# Patient Record
Sex: FEMALE | Race: OTHER | URBAN - METROPOLITAN AREA
[De-identification: names, ages, dates, MRNs, and addresses within clinical notes are randomized per-mention and may not be internally consistent; named-entity substitution may affect disease eponyms.]

---

## 2022-05-19 ENCOUNTER — OFFICE VISIT (OUTPATIENT)
Dept: FAMILY MEDICINE CLINIC | Age: 27
End: 2022-05-19

## 2022-05-19 VITALS
HEART RATE: 83 BPM | BODY MASS INDEX: 31.54 KG/M2 | HEIGHT: 63 IN | DIASTOLIC BLOOD PRESSURE: 83 MMHG | WEIGHT: 178 LBS | TEMPERATURE: 97.9 F | OXYGEN SATURATION: 98 % | SYSTOLIC BLOOD PRESSURE: 142 MMHG

## 2022-05-19 DIAGNOSIS — N39.3 STRESS INCONTINENCE, FEMALE: Primary | ICD-10-CM

## 2022-05-19 DIAGNOSIS — Z00.00 ENCOUNTER FOR SCREENING AND PREVENTATIVE CARE: ICD-10-CM

## 2022-05-19 LAB
BILIRUB UR QL STRIP: NEGATIVE
GLUCOSE UR-MCNC: NEGATIVE MG/DL
HCG URINE, QL. (POC): NEGATIVE
KETONES P FAST UR STRIP-MCNC: NEGATIVE MG/DL
PH UR STRIP: 7 [PH] (ref 4.6–8)
PROT UR QL STRIP: NEGATIVE
SP GR UR STRIP: 1.01 (ref 1–1.03)
UA UROBILINOGEN AMB POC: NORMAL (ref 0.2–1)
URINALYSIS CLARITY POC: CLEAR
URINALYSIS COLOR POC: YELLOW
URINE BLOOD POC: NORMAL
URINE LEUKOCYTES POC: NEGATIVE
URINE NITRITES POC: NEGATIVE
VALID INTERNAL CONTROL?: YES

## 2022-05-19 PROCEDURE — 81025 URINE PREGNANCY TEST: CPT | Performed by: NURSE PRACTITIONER

## 2022-05-19 PROCEDURE — 81003 URINALYSIS AUTO W/O SCOPE: CPT | Performed by: NURSE PRACTITIONER

## 2022-05-19 PROCEDURE — 99203 OFFICE O/P NEW LOW 30 MIN: CPT | Performed by: NURSE PRACTITIONER

## 2022-05-19 NOTE — PROGRESS NOTES
2022 : Martha Urbina (: 1995) is a 32 y.o. female, new patient, here for evaluation of the following chief complaint(s):  Pelvic Pain (radiating to her back pain. with vaginal itching x 1 month) and Skin Problem (Pt states she feels inside of her vagina an small ball 2 weeks ago)     ASSESSMENT/PLAN:  Below is the assessment and plan developed based on review of pertinent history, physical exam, labs, studies, and medications. 1. Stress incontinence, female  2. Encounter for screening and preventative care  -     AMB POC URINALYSIS DIP STICK AUTO W/O MICRO  -     AMB POC URINE PREGNANCY TEST, VISUAL COLOR COMPARISON  Kegels  May take ibuprofen 200 mg, 3 tab tid prn back pain. Return for 1 month F2F LK. No vaginal discharge and no odor  SUBJECTIVE/OBJECTIVE:  HPI Also used the injection 5 years ago. Had periods then. Used it for 2.5 years. Pain in lower back in the middle. Sometimes lower belly pain like menstrual pain, not now. Only has pain after standing for a long time. She has the injection every 3 months. Usually doesn't has the periods. She is spotting today and since Monday. February was a normal period. 5 days. Last injection was . Before that, Dec 28, 2021. Gave birth on 2021.     Results for orders placed or performed in visit on 22   AMB POC URINALYSIS DIP STICK AUTO W/O MICRO   Result Value Ref Range    Color (UA POC) Yellow     Clarity (UA POC) Clear     Glucose (UA POC) Negative Negative    Bilirubin (UA POC) Negative Negative    Ketones (UA POC) Negative Negative    Specific gravity (UA POC) 1.015 1.001 - 1.035    Blood (UA POC) 4+ Negative    pH (UA POC) 7.0 4.6 - 8.0    Protein (UA POC) Negative Negative    Urobilinogen (UA POC) 0.2 mg/dL 0.2 - 1    Nitrites (UA POC) Negative Negative    Leukocyte esterase (UA POC) Negative Negative   AMB POC URINE PREGNANCY TEST, VISUAL COLOR COMPARISON   Result Value Ref Range VALID INTERNAL CONTROL POC Yes     HCG urine, Ql. (POC) Negative Negative   With sneezing or cough has some urine release. Review of Systems: Negative for: fever, chest pain, shortness of breath, leg swelling. Social History:  reports that she has never smoked. She has never used smokeless tobacco. She reports previous alcohol use. She reports that she does not use drugs. Current Medications:   No current outpatient medications on file. Physical Examination: Patient's last menstrual period was 02/15/2022. Blood pressure (!) 142/83, pulse 83, temperature 97.9 °F (36.6 °C), temperature source Temporal, height 5' 2.99\" (1.6 m), weight 178 lb (80.7 kg), last menstrual period 02/15/2022, SpO2 98 %. General appearance - well developed, no acute distress. Chest - clear to auscultation. Heart - regular rate and rhythm without murmurs, rubs, or gallops. Abdomen - bowel sounds present x 4, NT, ND  Extremities - no CCE. Pelvic - no CMT or ovarian tenderness; no masses. Very slight prolapse, reducible, can be seen in vagina with bearing down. Tenderness noted lower back with palpation. No suprapubic tenderness. An electronic signature was used to authenticate this note.   -- Nestor Fink NP

## 2022-05-19 NOTE — PATIENT INSTRUCTIONS
Aparato reproductor femenino, vista lateral: Dibujo anatómico  Female Reproductive Organs, Side View: Anatomy Sketch     Revisado: 22 noviembre, 2021               Versión del contenido: 13.2  © 2006-2022 [employer]wise, Incorporated. Las instrucciones de cuidado fueron adaptadas bajo licencia por [employer] (which disclaims liability or warranty for this information). Si usted tiene [place] afección médica o sobre estas instrucciones, siempre pregunte a momin profesional de desean. Healthwise, Incorporated niega toda garantía o responsabilidad por momin uso de esta información. Ejercicios de Kegel: Instrucciones de cuidado  Kegel Exercises: Care Instructions  Generalidades     Los ejercicios de Kegel fortalecen los músculos que rodean la vejiga. Estos músculos controlan el flujo de [place]. Los ejercicios de Kegel a veces se llaman ejercicios \"para el piso pélvico\". Pueden ayudarle a prevenir la pérdida de [place] y a mantener los Fani Ream en momin lugar. Los ejercicios de Kegel pueden fortalecer los músculos pélvicos que se baker debilitado por la edad, el [place] y [place]. Pueden ayudar a prevenir o tratar la pérdida de orina. Usted hace los ejercicios de Kegel tensando los músculos que se utilizan al orinar. Probablemente tenga que hacer estos ejercicios carolyn varias semanas para sentirse mejor. La atención de seguimiento es evie parte clave de momin tratamiento y seguridad. Asegúrese de hacer y acudir a todas las citas, y llame a momin médico si está teniendo problemas. También es evie buena idea saber los resultados de adonay exámenes y mantener evie lista de los medicamentos que max. ¿Cómo puede cuidarse en el hogar? · Latasha ejercicios de Kegel. ? Detecte los músculos que necesita fortalecer. Para hacer esto, contraiga los músculos que impiden la salida de la orina mientras va al baño.  Estos son los mismos músculos que debe contraer carolyn los ejercicios de [place].  ? Contraiga los músculos lo primo posible. El abdomen y los muslos no deben moverse. ? Manténgalos apretados carolyn 3 segundos. Luego, relájelos otros 3 segundos. ? Empiece con 3 segundos. [place], añada 1 hortencia cada semana hasta que sea capaz de apretar carolyn 10 segundos. ? Repita lindsey ejercicio de 10 a 15 veces cada sesión. Latasha debi o más sesiones al día. · Puede verificar si está usando los músculos correctos. ? Tense los músculos que le ayudan a dejar de expulsar gases o que hacen que deje de orinar. Asegúrese de no [employer] del abdomen, las piernas o los glúteos. ? Coloque un dedo en la vagina y apriete los músculos a momin alrededor. Lo está haciendo rebeca cuando siente presión alrededor del dedo. El médico también puede sugerirle que se ponga pesos especiales en la vagina mientras hace los ejercicios. · Consulte con momin médico si no nota diferencia después de probar estos ejercicios carolyn varias semanas. Momin médico puede sugerirle que [place] de un fisioterapeuta o recomendarle otro tratamiento. ¿Dónde puede encontrar más información en inglés? Vaya a [email or web address]  Allie [id number] en la búsqueda para aprender más acerca de \"Ejercicios de [place]: Instrucciones de cuidado. \"  Revisado: 18 octubre, 2021               Versión del contenido: 13.2  © 7123-1712 [employer]. Las instrucciones de cuidado fueron adaptadas bajo licencia por [employer] (which disclaims liability or warranty for this information). Si usted tiene [place] afección médica o sobre estas instrucciones, siempre pregunte a momin profesional de desean. [facility] niega toda garantía o responsabilidad por momin uso de esta información.

## 2022-05-19 NOTE — PROGRESS NOTES
I have printed AVS and reviewed it with patient today. Patient verbalized understanding. I reviewed the patient information in the AVS with the patient and instructed her to read it at home. Patient verbalized understanding. Patient correctly stated her full name and date of birth prior to the information shared.  Analia with the [place] [id number] assisted with this discharge.  Adria Brizuela RN

## 2022-05-19 NOTE — PROGRESS NOTES
Results for orders placed or performed in visit on 05/19/22   AMB POC URINALYSIS DIP STICK AUTO W/O MICRO   Result Value Ref Range    Color (UA POC) Yellow     Clarity (UA POC) Clear     Glucose (UA POC) Negative Negative    Bilirubin (UA POC) Negative Negative    Ketones (UA POC) Negative Negative    Specific gravity (UA POC) 1.015 1.001 - 1.035    Blood (UA POC) 4+ Negative    pH (UA POC) 7.0 4.6 - 8.0    Protein (UA POC) Negative Negative    Urobilinogen (UA POC) 0.2 mg/dL 0.2 - 1    Nitrites (UA POC) Negative Negative    Leukocyte esterase (UA POC) Negative Negative   AMB POC URINE PREGNANCY TEST, VISUAL COLOR COMPARISON   Result Value Ref Range    VALID INTERNAL CONTROL POC Yes     HCG urine, Ql. (POC) Negative Negative